# Patient Record
Sex: MALE | Race: WHITE | Employment: FULL TIME | URBAN - NONMETROPOLITAN AREA
[De-identification: names, ages, dates, MRNs, and addresses within clinical notes are randomized per-mention and may not be internally consistent; named-entity substitution may affect disease eponyms.]

---

## 2020-11-17 ENCOUNTER — OFFICE VISIT (OUTPATIENT)
Dept: ORTHOPEDIC SURGERY | Age: 60
End: 2020-11-17
Payer: COMMERCIAL

## 2020-11-17 VITALS — BODY MASS INDEX: 24.52 KG/M2 | WEIGHT: 185 LBS | HEIGHT: 73 IN

## 2020-11-17 PROCEDURE — 99203 OFFICE O/P NEW LOW 30 MIN: CPT | Performed by: ORTHOPAEDIC SURGERY

## 2020-11-17 RX ORDER — METHYLPREDNISOLONE 4 MG/1
TABLET ORAL
Qty: 1 KIT | Refills: 0 | Status: SHIPPED | OUTPATIENT
Start: 2020-11-17

## 2020-11-17 NOTE — PROGRESS NOTES
I am evaluating this patient as a consult at the request of No referring provider defined for this encounter. Chief Complaint:  New Patient (LEFT SHOULDER FELL ONE MONTH AGO)      History of Present Illness:  Jenise Horner is a  64 y.o. left hand dominant male here regarding left shoulder pain, patient was mowing his lawn 1 month ago when he fell on outstretched left arm, over the last month he has been using Tylenol, ibuprofen, ice and activity modification without improvement of symptoms. 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 Patient denies numbness and tingling down the arm, he currently has pain with lifting, pulling patients and overhead activity. Pain also affects his sleep at night. Pain Assessment:       Medical History:  No past medical history on file.   Past Surgical History:   Procedure Laterality Date    ANKLE SURGERY      KNEE ARTHROSCOPY Right     meniscus repair    KNEE SURGERY      LITHOTRIPSY      SINUS SURGERY       Social History     Socioeconomic History    Marital status:      Spouse name: Not on file    Number of children: Not on file    Years of education: Not on file    Highest education level: Not on file   Occupational History    Not on file   Social Needs    Financial resource strain: Not on file    Food insecurity     Worry: Not on file     Inability: Not on file    Transportation needs     Medical: Not on file     Non-medical: Not on file   Tobacco Use    Smoking status: Never Smoker   Substance and Sexual Activity    Alcohol use: No     Alcohol/week: 0.0 standard drinks    Drug use: Not on file    Sexual activity: Not on file   Lifestyle    Physical activity     Days per week: Not on file     Minutes per session: Not on file    Stress: Not on file   Relationships    Social connections     Talks on phone: Not on file     Gets together: Not on file     Attends Spiritism service: Not on file     Active member of club or organization: Not on file     Attends meetings of clubs or organizations: Not on file     Relationship status: Not on file    Intimate partner violence     Fear of current or ex partner: Not on file     Emotionally abused: Not on file     Physically abused: Not on file     Forced sexual activity: Not on file   Other Topics Concern    Not on file   Social History Narrative    Not on file     Allergies   Allergen Reactions    Penicillins     Tramadol Other (See Comments)     \"GETS ADDICTED TO IT\"       Review of Systems:  Constitutional: negative  Respiratory: negative  Cardiovascular: negative  Musculoskeletal:negative except for New Patient (LEFT SHOULDER [address])    Relevant review of systems reviewed and available in the patient's chart in media tab    Vital Signs:  Vitals:    11/17/20 1457   Weight: 185 lb (83.9 kg)   Height: 6' 0.99\" (1.854 m)         General Exam:   Constitutional: Patient is adequately groomed with no evidence of malnutrition  Vascular: Examination reveals no swelling or calf tenderness. Peripheral pulses are palpable and 2+. Neurological: The patient has good coordination. There is no weakness or sensory deficit. PHYSICAL EXAMINATION: Inspection of the left shoulder reveals warm, dry, intact skin. There is no adenopathy. The distal neurovascular exam is grossly intact. Range of motion is 160 degrees of active forward flexion with discomfort past 90 degrees, positive empty can test with 4- out of 5 strength and discomfort, 4+ out of 5 strength with infraspinatus testing and subscapularis testing. Minimal tenderness in the bicipital groove, no pain with speeds or Yergason's. Strength is graded 5/5 in all other muscle groups. Examination of the contralateral shoulder reveals no atrophy or deformity. The skin is warm and dry.  Range of motion is within normal limits. There is no focal tenderness with palpation. Provocative SLAP, biceps tension, apprehension AC joint or rotator cuff tests are negative. Strength is graded 5/5 in all muscle groups. The distal neurovascular exam is grossly intact. Cervical spine: The skin is warm and dry. There is no swelling, warmth, or erythema. Range of motion is within normal limits. There is no paraspinal or spinous process tenderness. Spurling's sign is negative and did not produce shoulder pain. The distal neurovascular exam is grossly intact. Additional Comments: Sensation is intact to light touch throughout the median, ulnar and radial nerve distribution. Able to wiggle fingers, give thumbs up, A-OK and cross index and middle fingers. X-RAYS:  Four views of the left shoulder are obtained and reviewed. There is no periosteal reaction, medullary lesions, or osteopenia. Glenohumeral space is well maintained, the acromioclavicular joint space shows moderate degenerative changes, acromiohumeral space is well-maintained. Type 2 acromion. The lung fields are clear. Assessment: Left shoulder pain concerning for rotator cuff tear    Impression:  Encounter Diagnosis   Name Primary?     Left shoulder pain, unspecified chronicity Yes       Office Procedures:  Orders Placed This Encounter   Procedures    XR SHOULDER LEFT (MIN 2 VIEWS)    MRI SHOULDER LEFT WO CONTRAST     Standing Status:   Future     Standing Expiration Date:   11/17/2021     Scheduling Instructions:      [facility] [address] [place], [facility] [address]      [phone]      FAX: [phone]            **PUSH IMAGES TO [facility] PACS**                  PENDING APPROVAL FROM [facility] DEPARTMENT             TIME AND DATE PATIENT AVAILABILITY:             Friday or Saturday gets home at 6:30AM schedule 7:30AM-8AM     Order Specific Question:   Reason for exam:     Answer:   R/O LEFT ROTATOR CUFF TEAR     No orders of the defined types were placed in this encounter. Treatment Plan: Based on patient's history and physical exam I'm concerned about rotator cuff tear therefore I would like to obtain an MRI to further evaluate. Once the MRI is obtained the patient will follow up with me for further evaluation and discussion. Patient agrees with this plan, all of their questions were answered best of our ability and to their satisfaction. Provided a Medrol Dosepak today for pain and inflammation.         [facility]

## 2020-11-18 ENCOUNTER — TELEPHONE (OUTPATIENT)
Dept: ORTHOPEDIC SURGERY | Age: 60
End: 2020-11-18

## 2020-11-18 NOTE — TELEPHONE ENCOUNTER
Called & talked to the patient's wife informing her that I am still waiting on our pre-cert department to approve the MRI, once it is approved I will call and schedule the appointment. Then I will call them and let them know it has been approved and scheduled.

## 2020-11-20 ENCOUNTER — TELEPHONE (OUTPATIENT)
Dept: ORTHOPEDIC SURGERY | Age: 60
End: 2020-11-20

## 2020-11-23 ENCOUNTER — TELEPHONE (OUTPATIENT)
Dept: ORTHOPEDIC SURGERY | Age: 60
End: 2020-11-23

## 2020-11-23 NOTE — TELEPHONE ENCOUNTER
Called & left a voicemail for the wife returning a call in regards to her 's MRI. She can call me at [phone] ext.  [phone]

## 2020-11-24 ENCOUNTER — OFFICE VISIT (OUTPATIENT)
Dept: ORTHOPEDIC SURGERY | Age: 60
End: 2020-11-24
Payer: COMMERCIAL

## 2020-11-24 ENCOUNTER — TELEPHONE (OUTPATIENT)
Dept: ORTHOPEDIC SURGERY | Age: 60
End: 2020-11-24

## 2020-11-24 VITALS — BODY MASS INDEX: 24.52 KG/M2 | WEIGHT: 185 LBS | HEIGHT: 73 IN

## 2020-11-24 PROCEDURE — 99214 OFFICE O/P EST MOD 30 MIN: CPT | Performed by: ORTHOPAEDIC SURGERY

## 2020-11-24 PROCEDURE — L3670 SO ACRO/CLAV CAN WEB PRE OTS: HCPCS | Performed by: ORTHOPAEDIC SURGERY

## 2020-11-24 NOTE — TELEPHONE ENCOUNTER
Called & talked with the patient informing him that since he got his MRI done, we could see him today at 11:10AM, Patient understood and will come at 11:10AM at [facility] to go over MRI results w/ Doctor Fred Salazar.

## 2020-11-24 NOTE — PROGRESS NOTES
Chief Complaint:  Follow-up (TR MRI LEFT SHOULDER)      SUBJECTIVE:  Mahesh Pearson is a 64 y.o. male who returns today to review MRI results of the left shoulder, continues to have anterior and lateral pain as well as weakness, states he had complete loss of strength this week while trying to lift a gallon of milk. Is here today with his wife. Injury occurred in October while mowing his lawn. Pain Assessment:         Medical History:  No past medical history on file.   Past Surgical History:   Procedure Laterality Date    ANKLE SURGERY      KNEE ARTHROSCOPY Right     meniscus repair    KNEE SURGERY      LITHOTRIPSY      SINUS SURGERY       Social History     Socioeconomic History    Marital status:      Spouse name: Not on file    Number of children: Not on file    Years of education: Not on file    Highest education level: Not on file   Occupational History    Not on file   Social Needs    Financial resource strain: Not on file    Food insecurity     Worry: Not on file     Inability: Not on file    Transportation needs     Medical: Not on file     Non-medical: Not on file   Tobacco Use    Smoking status: Never Smoker   Substance and Sexual Activity    Alcohol use: No     Alcohol/week: 0.0 standard drinks    Drug use: Not on file    Sexual activity: Not on file   Lifestyle    Physical activity     Days per week: Not on file     Minutes per session: Not on file    Stress: Not on file   Relationships    Social connections     Talks on phone: Not on file     Gets together: Not on file     Attends Mormon service: Not on file     Active member of club or organization: Not on file     Attends meetings of clubs or organizations: Not on file     Relationship status: Not on file    Intimate partner violence     Fear of current or ex partner: Not on file     Emotionally abused: Not on file     Physically abused: Not on file     Forced sexual activity: Not on file   Other Topics Concern    Not on file   Social History Narrative    Not on file     Current Outpatient Medications   Medication Sig Dispense Refill    methylPREDNISolone (MEDROL, SRAVANTHI,) 4 MG tablet Take by mouth. 1 kit 0    meloxicam (MOBIC) 15 MG tablet Take 1 tablet by mouth daily 30 tablet 3    acetaminophen-codeine (TYLENOL/CODEINE #3) 300-30 MG per tablet Take 1 tablet by mouth every 4 hours as needed for Pain 40 tablet 0    ibuprofen (ADVIL;MOTRIN) 200 MG tablet Take 200 mg by mouth every 6 hours as needed for Pain       No current facility-administered medications for this visit. Allergies   Allergen Reactions    Penicillins     Tramadol Other (See Comments)     \"GETS ADDICTED TO IT\"       Review of Systems:  Constitutional: negative  Respiratory: negative  Cardiovascular: negative  Musculoskeletal:negative except for Follow-up (TR MRI LEFT SHOULDER)    Relevant review of systems reviewed and available in the patient's chart in media tab    General Exam:   Constitutional: Patient is adequately groomed with no evidence of malnutrition  Mental Status: The patient is oriented to time, place and person. The patient's mood and affect are appropriate. Vascular: Examination reveals no swelling or calf tenderness. Peripheral pulses are palpable and 2+. OBJECTIVE:  Vital Signs:  Vitals:    11/24/20 1106   Weight: 185 lb (83.9 kg)   Height: 6' 0.99\" (1.854 m)       Appearance: alert, well appearing, and in no distress, oriented to person, place, and time and normal appearing weight. Physical exam:   180 degrees of active forward flexion today, tenderness to palpation in the bicipital groove and pain with speeds and Yergason's, discomfort with belly press and bearhug as well as 4 out of 5 strength. 4+ out of 5 strength and pain with empty can test.  Skin clean dry and intact. Distal neurovascular exam is intact      X-RAYS:  Four views of the left shoulder are reviewed.  There is no periosteal reaction, medullary lesions, or osteopenia. Glenohumeral space is well maintained, the acromioclavicular joint space shows moderate degenerative changes, acromiohumeral space is well-maintained. Type 2 acromion. The lung fields are clear. MRI images of the left shoulder were reviewed and show:  1. Biceps pulley mechanism injury, with partial-thickness delamination tear of the superior subscapularis tendon insertional fibers, injury of the coracohumeral and transverse ligaments, and mild tendinopathy and partial-thickness tear of the distal intra-articular peripheral transitional long head biceps tendon, which is medially perched at the margin of the bicipital groove. 2. Chronic, nondisplaced SLAP 2C type tear of the superior glenoid labrum. 3. Mild to moderate tendinopathy and peritendinitis of the supraspinatus and infraspinatus tendons, with small, subcentimeter interstitial delamination tears of the supraspinatus anterior footprint and infraspinatus posterolateral footprint. 4. Mild to moderate AC joint arthrosis, with mildly inflamed mesoacromion-type os acromiale. Assessment : Left shoulder incomplete subscapularis tear, incomplete supraspinatus tear, medial subluxation of biceps with tendinopathy    Impression:  Encounter Diagnoses   Name Primary?  Incomplete tear of left rotator cuff, unspecified whether traumatic Yes    Biceps muscle tear, left, initial encounter        Office Procedures:  Orders Placed This Encounter   Procedures    Breg Sling Shot 2 Shoulder Sling     Patient was prescribed a Breg Sling Shot II Shoulder Brace. The left shoulder will require stabilization / immobilization from this orthosis. The orthosis will assist in protecting the affected area, provide functional support and facilitate healing. The device was ordered and fit on 11/24/2020.     The patient was educated and fit by a healthcare professional with expert knowledge and specialization in brace application while under the direct minutes face to face with the patient including 13+ minutes discussing and answering questions regarding the risks, benefits, and complications of left shoulder video arthroscopy biceps tenodesis, subscapularis repair, evaluation of supraspinatus with possible repair, subacromial decompression   in detail. We talked about the risks of surgery which include but are not limited to infection, bleeding, nerve injury resulting in motor or sensory loss, DVT, RSD, persistent pain, loss of motion, functional instability, and need for further surgery. At no time were any guarantees implied or stated. The patient acknowledged these risks and electively reviewed and signed the consent form. Surgery will be scheduled for a mutually convenient time. Patient will obtain medical clearance from their PCP prior to surgery. Sling was provided today to wear postoperatively. Postoperative rehab protocols were discussed in detail, patient will remain in the sling 6 weeks post surgery and will attend physical therapy likely 3 months, as he is left-hand-dominant he may require time away from work up to 3 months before returning to heavy lifting. Patient and his wife agrees with this plan, all of their questions were answered best of our ability and to their satisfaction.       Myranda Larios

## 2020-11-27 ENCOUNTER — HOSPITAL ENCOUNTER (OUTPATIENT)
Age: 60
Discharge: HOME | End: 2020-11-27
Payer: COMMERCIAL

## 2020-11-27 PROCEDURE — U0002 COVID-19 LAB TEST NON-CDC: HCPCS

## 2020-11-27 PROCEDURE — U0003 INFECTIOUS AGENT DETECTION BY NUCLEIC ACID (DNA OR RNA); SEVERE ACUTE RESPIRATORY SYNDROME CORONAVIRUS 2 (SARS-COV-2) (CORONAVIRUS DISEASE [COVID-19]), AMPLIFIED PROBE TECHNIQUE, MAKING USE OF HIGH THROUGHPUT TECHNOLOGIES AS DESCRIBED BY CMS-2020-01-R: HCPCS

## 2020-11-28 LAB — SARS-COV-2, PCR: NOT DETECTED

## 2020-12-08 ENCOUNTER — TELEPHONE (OUTPATIENT)
Dept: ORTHOPEDIC SURGERY | Age: 60
End: 2020-12-08

## 2020-12-08 RX ORDER — OXYCODONE HYDROCHLORIDE AND ACETAMINOPHEN 5; 325 MG/1; MG/1
1 TABLET ORAL EVERY 6 HOURS PRN
Qty: 28 TABLET | Refills: 0 | Status: SHIPPED | OUTPATIENT
Start: 2020-12-08 | End: 2020-12-15

## 2020-12-08 NOTE — TELEPHONE ENCOUNTER
Patient wife called and left message stating that the patient was having to take his pain medication q4hr for discomfort.  Wife said they were told this was okay and would reach out for a refill when needed       Surgery date 12/4   LEFT SHOULDER

## 2020-12-14 ENCOUNTER — TELEPHONE (OUTPATIENT)
Dept: ORTHOPEDIC SURGERY | Age: 60
End: 2020-12-14

## 2020-12-14 RX ORDER — OXYCODONE HYDROCHLORIDE AND ACETAMINOPHEN 5; 325 MG/1; MG/1
1 TABLET ORAL EVERY 6 HOURS PRN
Qty: 28 TABLET | Refills: 0 | Status: SHIPPED | OUTPATIENT
Start: 2020-12-14 | End: 2020-12-21

## 2020-12-15 ENCOUNTER — OFFICE VISIT (OUTPATIENT)
Dept: ORTHOPEDIC SURGERY | Age: 60
End: 2020-12-15

## 2020-12-15 ENCOUNTER — HOSPITAL ENCOUNTER (OUTPATIENT)
Dept: PHYSICAL THERAPY | Age: 60
Setting detail: THERAPIES SERIES
Discharge: HOME | End: 2020-12-15
Payer: COMMERCIAL

## 2020-12-15 ENCOUNTER — TELEPHONE (OUTPATIENT)
Dept: ORTHOPEDIC SURGERY | Age: 60
End: 2020-12-15

## 2020-12-15 VITALS — WEIGHT: 185 LBS | HEIGHT: 73 IN | BODY MASS INDEX: 24.52 KG/M2

## 2020-12-15 PROBLEM — Z98.890 S/P ARTHROSCOPY OF LEFT SHOULDER: Status: ACTIVE | Noted: 2020-12-15

## 2020-12-15 PROCEDURE — 97140 MANUAL THERAPY 1/> REGIONS: CPT

## 2020-12-15 PROCEDURE — 97016 VASOPNEUMATIC DEVICE THERAPY: CPT

## 2020-12-15 PROCEDURE — 97161 PT EVAL LOW COMPLEX 20 MIN: CPT

## 2020-12-15 PROCEDURE — 99024 POSTOP FOLLOW-UP VISIT: CPT | Performed by: ORTHOPAEDIC SURGERY

## 2020-12-15 PROCEDURE — 97110 THERAPEUTIC EXERCISES: CPT

## 2020-12-15 NOTE — PLAN OF CARE
[facility] [address] [facility] [address] [facility] [place], [address] [facility] [address]  Phone: [phone], [phone]                                                    Physical Therapy Certification    Dear Referring Practitioner: Osiris Winston,    We had the pleasure of evaluating the following patient for physical therapy services at [address] [place] [address]. A summary of our findings can be found in the initial assessment below. This includes our plan of care. If you have any questions or concerns regarding these findings, please do not hesitate to contact me at the office phone number checked above. Thank you for the referral.       Physician Signature:_______________________________Date:__________________  By signing above (or electronic signature), therapists plan is approved by physician      Patient: Dorothy Gardner   : 1960   MRN: [id number]  Referring Physician: Referring Practitioner: Osiris Winston      Evaluation Date: 12/15/2020      Medical Diagnosis Information:  Diagnosis: s/p Left Shoulder RTC Debridement, Biceps Tenodesis, SAD 2020   Treatment Diagnosis: M75.112                                         Insurance information: PT Insurance Information: BCBS    Precautions/ Contra-indications/Relevant Medical History:     C-SSRS Triggered by Intake questionnaire (Past 2 wk assessment):   [x] No, Questionnaire did not trigger screening.   [] Yes, Patient intake triggered further evaluation      [] C-SSRS Screening completed  [] PCP notified via Plan of Care  [] Emergency services notified     Latex Allergy:  [x]NO      []YES     Preferred Language for Healthcare:   [x]English       []other:    SUBJECTIVE: Patient stated complaint: Patient is a 65 y/o male who presents s/p Left Shoulder Scope for debridement of RTC along with Bicep Tenodesis and SAD. Patient reports hurt in in October while mowing.     Functional Disability Index: Quick Dash/Modified Oswestry: 82% (Total Number Sum: 47/55)    Pain Scale: 0/10current; 5/10 with movement   Easing factors: ice  Provocative factors: movement, wrong position of sling     Type: []Constant   [x]Intermittent  []Radiating []Localized []other:     Numbness/Tingling: Patient     Functional Limitations/Impairments: [x]Lifting/reaching [x]Grooming [x]Carrying    [x]ADL's [x]Driving [x]Sports/Recreations   []Other:    Occupation/School: [employer]iation [employer]     Living Status/Prior Level of Function: Independent with ADLs and IADLs     OBJECTIVE:     CERV ROM     Cervical Flexion WNL    Cervical Extension \"    Cervical SB \"    Cervical rotation \"         ROM Left Right   Shoulder Flex 90 °  WNL   Shoulder Abd  \"   Shoulder ER 15 ° at 30 ° Abd \"   Shoulder IR  \"   Elbow Flex  \"   Elbow Ext Lacking about 15 °  \"   Wrist Flex     Wrist Ext     Strength  Left Right   Shoulder Flex N.T. 5/5   Shoulder Scap \" \"   Shoulder ER \" \"   Shoulder IR \" \"   Elbow Flex \" \"   Elbow Ext \" \"   Wrist Flex     Wrist Ext     Davie        Reflexes/Sensation (myotomes/dermatomes): dematomes ( WNL)   []Normal    []Abnormal:      Joint mobility:    []Normal    [x]Hypo   []Hyper    Palpation: noted tenderness throughout bicep muscle belly and throughout shoulder joint    Functional Mobility/Transfers: slowed and adapted     Posture: elevated shoulder and head tilted     Bandages/Dressings/Incisions: surgical incision     Gait (include devices/WB status): decreased arm swing with decreased    Orthopedic Special Tests: n/a post-op    [x] Patient history, allergies, meds reviewed. Medical chart reviewed. See intake form. Review Of Systems (ROS):  [x]Performed Review of systems (Integumentary, CardioPulmonary, Neurological) by intake and observation. Intake form has been scanned into medical record. Patient has been instructed to contact their primary care physician regarding ROS issues if not already being addressed at this time. functional questionnaire and intake)   []Reduced ability to tolerate prolonged functional positions   []Reduced ability or difficulty with changes of positions or transfers between positions   [x]Reduced ability to maintain good posture and demonstrate good body mechanics with sitting, bending, and lifting   [x] Reduced ability or tolerance with driving and/or computer work   [x]Reduced ability to sleep   [x]Reduced ability to perform lifting, reaching, carrying tasks   [x]Reduced ability to tolerate impact through UE   [x]Reduced ability to reach behind back   [x]Reduced ability to  or hold objects   [x]Reduced ability to throw or toss an object   []other:    Participation Restrictions   [x]Reduced participation in self care activities   [x]Reduced participation in home management activities   [x]Reduced participation in work activities   [x]Reduced participation in social activities. [x]Reduced participation in sport/recreation activities. Classification:   [x]Signs/symptoms consistent with post-surgical status including decreased ROM, strength and function. []Signs/symptoms consistent with joint sprain/strain   []Signs/symptoms consistent with shoulder impingement   []Signs/symptoms consistent with shoulder/elbow/wrist tendinopathy   []Signs/symptoms consistent with Rotator cuff tear   []Signs/symptoms consistent with labral tear   []Signs/symptoms consistent with postural dysfunction    []Signs/symptoms consistent with Glenohumeral IR Deficit - <45 degrees   []Signs/symptoms consistent with facet dysfunction of cervical/thoracic spine    []Signs/symptoms consistent with pathology which may benefit from Dry needling     []other:      Tolerance of evaluation/treatment:    []Excellent   [x]Good    []Fair   []Poor    Physical Therapy Evaluation Complexity Justification   [x] A history of present problem with:  [] no personal factors and/or comorbidities that impact the plan of care;  []1-2 personal factors and/or comorbidities that impact the plan of care  [x]3 personal factors and/or comorbidities that impact the plan of care  [x] An examination of body systems using standardized tests and measures addressing any of the following: body structures and functions (impairments), activity limitations, and/or participation restrictions;:  [] a total of 1-2 or more elements   [] a total of 3 or more elements   [x] a total of 4 or more elements   [x] A clinical presentation with:  [x] stable and/or uncomplicated characteristics   [] evolving clinical presentation with changing characteristics  [] unstable and unpredictable characteristics;   [x] Clinical decision making of [x] low, [] moderate, [] high complexity using standardized patient assessment instrument and/or measurable assessment of functional outcome. [x] EVAL (LOW) 81893 (typically 20 minutes face-to-face)  [] EVAL (MOD) 89719 (typically 30 minutes face-to-face)  [] EVAL (HIGH) 20342 (typically 45 minutes face-to-face)  [] RE-EVAL     PLAN:  Frequency/Duration:  1-2 days per week for 12 weeks:  INTERVENTIONS:  [x]  Therapeutic exercise including: strength training, ROM, for upper extremity and core   [x]  NMR activation and proprioception for UE and Core   [x]  Manual therapy as indicated for UE and spine to include: Dry Needling/IASTM, STM, PROM, Gr I-IV mobilizations, manipulation. [x] Modalities as needed that may include: thermal agents, E-stim, Biofeedback, US, iontophoresis as indicated  [x] Patient education on joint protection, postural re-education, activity modification, progression of HEP. HEP instruction: (see scanned forms)    GOALS:    Short Term Goals: To be achieved in: 2 weeks  1. Independent in HEP and progression per patient tolerance, in order to prevent re-injury. [] Progressing: [] Met: [] Not Met: [] Adjusted   2.  Patient will have a decrease in pain to facilitate improvement in movement, function, and ADLs as indicated by Functional Deficits. [] Progressing: [] Met: [] Not Met: [] Adjusted     Long Term Goals: To be achieved in: 12 weeks  1. Disability index score of 20% or less for the QuickDash/Oswestry to assist with reaching prior level of function. [] Progressing: [] Met: [] Not Met: [] Adjusted   2. Patient will demonstrate increased AROM to equal the opposite side bilaterally to allow for proper joint functioning as indicated by patients Functional Deficits. [] Progressing: [] Met: [] Not Met: [] Adjusted   3. Patient will demonstrate an increase in strength of B UE  to allow for proper functional mobility as indicated by patients Functional Deficits. [] Progressing: [] Met: [] Not Met: [] Adjusted   4. Patient will return to all transfers, work activities, and functional activities without increased symptoms or restriction. [] Progressing: [] Met: [] Not Met: [] Adjusted   5. Patient will have 0/10 pain with ADL's.  [] Progressing: [] Met: [] Not Met: [] Adjusted   6.  Patient stated goal: To have full motion and strength of L UE  [] Progressing: [] Met: [] Not Met: [] Adjusted      Electronically signed by:  Delbert Zavala, PT

## 2020-12-15 NOTE — PROGRESS NOTES
HISTORY OF PRESENT ILLNESS: The patient returns today for the first postoperative visit after left shoulder video arthroscopy rotator cuff debridement, biceps tenodesis, subacromial decompression. Pain control has been satisfactory with oral medications. There have been no fevers or chills. 5/10 pain    DOS: 12/4/20     ]          PHYSICAL EXAMINATION: Inspection reveals expected swelling. Portal sites are clean and dry. The skin is warm. Range of motion is limited by pain and swelling. Bulge is noted a the biceps with flexion. The distal neurovascular exam is grossly intact. ASSESSMENT/PLAN: Doing well after left shoulder video arthroscopy rotator cuff debridement, biceps tenodesis, subacromial decompression. The UltraSling will be worn at all times except for hygiene and the prescribed exercises. I reviewed and demonstrated the standard postoperative exercises and provided them with a prescription for physical therapy. I have recommended ice and judicious use of narcotics. Bulge of the biceps was discussed, this will be cosmetic but will not effect function or strength. The sling will be worn at all times except for hygiene and the prescribed exercises. They will follow up in approximately six weeks for repeat evaluation. Diagnosis Orders   1.  S/P arthroscopy of left shoulder         David Timur

## 2020-12-15 NOTE — FLOWSHEET NOTE
[facility], [address] [place] [address] [address]o [address], [address]    Physical Therapy Treatment Note/ Progress Report:     Date:  12/15/2020    Patient Name:  Alan Gloria    :  1960  MRN: [id number]  Restrictions/Precautions:    Medical/Treatment Diagnosis Information:  · Diagnosis: s/p Left Shoulder RTC Debridement, Biceps Tenodesis, SAD 2020  · Treatment Diagnosis: F29.614  Insurance/Certification information:  PT Insurance Information: [facility]  Physician Information:  Referring Practitioner: Veronica Miranda  Has the plan of care been signed (Y/N):        []  Yes  [x]  No     Date of Patient follow up with Physician:     Is this a Progress Report:     []  Yes  [x]  No      If Yes:  Date Range for reporting period:  Initial Eval: 12/15/2020  Beginnin/15/2020 --- Endin2021    Progress report will be due (10 Rx or 30 days whichever is less):      Recertification will be due (POC Duration  / 90 days whichever is less): 3/15/2021      Visit # Insurance Allowable Auth Required   In Person 1 30 visits []  Yes     []  No    Tele Health -  []  Yes     []  No    Total 1         Functional Scale: Quick Dash: 82% (Total Number Sum: 47/55)  Date assessed: 12/15/2020     Latex Allergy:  [x]NO      []YES  Preferred Language for Healthcare:   [x]English       []other:    Pain level:  0/10current; 5/10 with movement     SUBJECTIVE:  See eval    OBJECTIVE: See eval   Observation:    Test measurements:      RESTRICTIONS/PRECAUTIONS:  Bicep Tenodesis /RTC Debridement    Exercises/Interventions:   Therapeutic Ex (07114)  Therapeutic Activity (06001)  NMR re-education (17551) Sets/Reps Notes/CUES   Pulley          Upper Trap Stretch 3 x 30\"    Levator Scap  Stretch 3 x 30\"         AAROM Elbow Flexion 20 x          Wrist flex/ext; Rad/Uln; Pro/Sup 20 x          Self PROM Flexion 15 x     Self PROM ER 15 x                                                                      Manual Intervention ([id number]) Jt Mobs 5'    PROM 10'    STM  5'                                       Patient Education 10' Pt ed with HEP and progress        Therapeutic Exercise and NMR EXR  [x] (33255) Provided verbal/tactile cueing for activities related to strengthening, flexibility, endurance, ROM  for improvements in scapular, scapulothoracic and UE control with self care, reaching, carrying, lifting, house/yardwork, driving/computer work. [x] (31721) Provided verbal/tactile cueing for activities related to improving balance, coordination, kinesthetic sense, posture, motor skill, proprioception  to assist with  scapular, scapulothoracic and UE control with self care, reaching, carrying, lifting, house/yardwork, driving/computer work. Therapeutic Activities:    [x] (79357 or 77561) Provided verbal/tactile cueing for activities related to improving balance, coordination, kinesthetic sense, posture, motor skill, proprioception and motor activation to allow for proper function of scapular, scapulothoracic and UE control with self care, carrying, lifting, driving/computer work.      Home Exercise Program:    [x] (46553) Reviewed/Progressed HEP activities related to strengthening, flexibility, endurance, ROM of scapular, scapulothoracic and UE control with self care, reaching, carrying, lifting, house/yardwork, driving/computer work  [x] (86912) Reviewed/Progressed HEP activities related to improving balance, coordination, kinesthetic sense, posture, motor skill, proprioception of scapular, scapulothoracic and UE control with self care, reaching, carrying, lifting, house/yardwork, driving/computer work      Manual Treatments:  PROM / STM / Oscillations-Mobs:  G-I, II, III, IV (PA's, Inf., Post.)  [x] (78163) Provided manual therapy to mobilize soft tissue/joints of cervical/CT, scapular GHJ and UE for the purpose of modulating pain, promoting relaxation,  increasing ROM, reducing/eliminating soft tissue swelling/inflammation/restriction, improving soft tissue extensibility and allowing for proper ROM for normal function with self care, reaching, carrying, lifting, house/yardwork, driving/computer work    Modalities:  Vaso x 15' L2     Charges:  Timed Code Treatment Minutes: 30'   Total Treatment Minutes:  60'   BWC:  TE TIME:  NMR TIME:  MANUAL TIME:  UNTIMED MINUTES:  Medicare Total:   -  -  -  -  -      [x] EVAL (LOW) 79205 (typically 20 minutes face-to-face)  [] EVAL (MOD) 40248 (typically 30 minutes face-to-face)  [] EVAL (HIGH) 17250 (typically 45 minutes face-to-face)  [] RE-EVAL     [x] WF(99355) x  1   [] IONTO  [] NMR (05111) x     [x] VASO x 1   [x] Manual (58532) x 1    [] Other:  [] TA x      [] Mech Traction (33113)  [] ES(attended) (50493)     [] ES (un) (97598):    ASSESSMENT:  See eval    GOALS:   Short Term Goals: To be achieved in: 2 weeks  1. Independent in HEP and progression per patient tolerance, in order to prevent re-injury. [] Progressing: [] Met: [] Not Met: [] Adjusted   2. Patient will have a decrease in pain to facilitate improvement in movement, function, and ADLs as indicated by Functional Deficits. [] Progressing: [] Met: [] Not Met: [] Adjusted     Long Term Goals: To be achieved in: 12 weeks  1. Disability index score of 20% or less for the QuickDash/Oswestry to assist with reaching prior level of function. [] Progressing: [] Met: [] Not Met: [] Adjusted   2. Patient will demonstrate increased AROM to equal the opposite side bilaterally to allow for proper joint functioning as indicated by patients Functional Deficits. [] Progressing: [] Met: [] Not Met: [] Adjusted   3. Patient will demonstrate an increase in strength of B UE  to allow for proper functional mobility as indicated by patients Functional Deficits. [] Progressing: [] Met: [] Not Met: [] Adjusted   4. Patient will return to all transfers, work activities, and functional activities without increased symptoms or restriction.    [] Progressing: [] Met: [] Not Met: [] Adjusted   5. Patient will have 0/10 pain with ADL's.  [] Progressing: [] Met: [] Not Met: [] Adjusted   6. Patient stated goal: To have full motion and strength of L UE  [] Progressing: [] Met: [] Not Met: [] Adjusted     Overall Progression Towards Functional goals/ Treatment Progress Update:  [] Patient is progressing as expected towards functional goals listed. [] Progression is slowed due to complexities/Impairments listed. [] Progression has been slowed due to co-morbidities. [x] Plan just implemented, too soon to assess goals progression <30days   [] Goals require adjustment due to lack of progress  [] Patient is not progressing as expected and requires additional follow up with physician  [] Other    Prognosis for POC: [x] Good [] Fair  [] Poor    Patient requires continued skilled intervention: [x] Yes  [] No    Treatment/Activity Tolerance:  [x] Patient able to complete treatment  [] Patient limited by fatigue  [] Patient limited by pain    [] Patient limited by other medical complications  [] Other:     Return to Play: (if applicable)   []  Stage 1: Intro to Strength   []  Stage 2: Return to Run and Strength   []  Stage 3: Return to Jump and Strength   []  Stage 4: Dynamic Strength and Agility   []  Stage 5: Sport Specific Training     []  Ready to Return to Play, Meets All Above Stages   []  Not Ready for Return to Sports   Comments:                         PLAN: See eval  [] Continue per plan of care [] Alter current plan (see comments above)  [x] Plan of care initiated [] Hold pending MD visit [] Discharge    Electronically signed by:  Inna Looney PT    Note: If patient does not return for scheduled/ recommended follow up visits, this note will serve as a discharge from care along with most recent update on progress.

## 2020-12-17 ENCOUNTER — TELEPHONE (OUTPATIENT)
Dept: ORTHOPEDIC SURGERY | Age: 60
End: 2020-12-17

## 2020-12-18 ENCOUNTER — HOSPITAL ENCOUNTER (OUTPATIENT)
Dept: PHYSICAL THERAPY | Age: 60
Setting detail: THERAPIES SERIES
Discharge: HOME | End: 2020-12-18
Payer: COMMERCIAL

## 2020-12-18 PROCEDURE — 97016 VASOPNEUMATIC DEVICE THERAPY: CPT | Performed by: PHYSICAL THERAPY ASSISTANT

## 2020-12-18 PROCEDURE — 97110 THERAPEUTIC EXERCISES: CPT | Performed by: PHYSICAL THERAPY ASSISTANT

## 2020-12-18 PROCEDURE — 97140 MANUAL THERAPY 1/> REGIONS: CPT | Performed by: PHYSICAL THERAPY ASSISTANT

## 2020-12-18 NOTE — FLOWSHEET NOTE
[facility], [address] [place] [address], [facility]    Physical Therapy Treatment Note/ Progress Report:     Date:  2020    Patient Name:  Christie Pringle    :  1960  MRN: [id number]  Restrictions/Precautions:    Medical/Treatment Diagnosis Information:  · Diagnosis: s/p Left Shoulder RTC Debridement, Biceps Tenodesis, SAD 2020  · Treatment Diagnosis: M18.844  Insurance/Certification information:  PT Insurance Information: [facility]  Physician Information:  Referring Practitioner: Sanjuana Macedo  Has the plan of care been signed (Y/N):        []  Yes  [x]  No     Date of Patient follow up with Physician:     Is this a Progress Report:     []  Yes  [x]  No      If Yes:  Date Range for reporting period:  Initial Eval: 12/15/2020  Beginnin/15/2020 --- Endin2021    Progress report will be due (10 Rx or 30 days whichever is less): 2149     Recertification will be due (POC Duration  / 90 days whichever is less): 3/15/2021      Visit # Insurance Allowable Auth Required   In Person 2 30 visits []  Yes     []  No    Tele Health -  []  Yes     []  No    Total 2         Functional Scale: Quick Dash: 82% (Total Number Sum: 47/55)  Date assessed: 12/15/2020     Latex Allergy:  [x]NO      []YES  Preferred Language for Healthcare:   [x]English       []other:    Pain level:  0/10current; 5/10 with movement     SUBJECTIVE:  Was sore after last visit. Exercises are going well. Sling still feels too short. Sleeping in a recliner.       OBJECTIVE: See eval   Observation:    Test measurements:      RESTRICTIONS/PRECAUTIONS:  Bicep Tenodesis /RTC Debridement    Exercises/Interventions:   Therapeutic Ex (93423)  Therapeutic Activity (35925)  NMR re-education (17513) Sets/Reps Notes/CUES   Pulley          Upper Trap Stretch 3 x 30\"    Levator Scap  Stretch 3 x 30\"         AAROM Elbow Flexion 20 x          Wrist flex/ext; Rad/Uln; Pro/Sup 20 x          Self PROM Flexion 15 x     Self PROM ER with cane 15 x               Table Slides flexion, ER, scaption  10x10\"                                                      Manual Intervention (50273)     Jt Mobs 5'    PROM 10'    STM  5'                                       Patient Education 10' Pt ed with HEP and progress        Therapeutic Exercise and NMR EXR  [x] (86994) Provided verbal/tactile cueing for activities related to strengthening, flexibility, endurance, ROM  for improvements in scapular, scapulothoracic and UE control with self care, reaching, carrying, lifting, house/yardwork, driving/computer work. [x] (26540) Provided verbal/tactile cueing for activities related to improving balance, coordination, kinesthetic sense, posture, motor skill, proprioception  to assist with  scapular, scapulothoracic and UE control with self care, reaching, carrying, lifting, house/yardwork, driving/computer work. Therapeutic Activities:    [x] (89507 or 60884) Provided verbal/tactile cueing for activities related to improving balance, coordination, kinesthetic sense, posture, motor skill, proprioception and motor activation to allow for proper function of scapular, scapulothoracic and UE control with self care, carrying, lifting, driving/computer work.      Home Exercise Program:    [x] (95266) Reviewed/Progressed HEP activities related to strengthening, flexibility, endurance, ROM of scapular, scapulothoracic and UE control with self care, reaching, carrying, lifting, house/yardwork, driving/computer work  [x] (95066) Reviewed/Progressed HEP activities related to improving balance, coordination, kinesthetic sense, posture, motor skill, proprioception of scapular, scapulothoracic and UE control with self care, reaching, carrying, lifting, house/yardwork, driving/computer work      Manual Treatments:  PROM / STM / Oscillations-Mobs:  G-I, II, III, IV (PA's, Inf., Post.)  [x] (40065) Provided manual therapy to mobilize soft tissue/joints of cervical/CT, scapular GHJ and UE for the purpose of modulating pain, promoting relaxation,  increasing ROM, reducing/eliminating soft tissue swelling/inflammation/restriction, improving soft tissue extensibility and allowing for proper ROM for normal function with self care, reaching, carrying, lifting, house/yardwork, driving/computer work    Modalities:  Vaso x 15' L2     Charges:  Timed Code Treatment Minutes: 39'   Total Treatment Minutes:  55'   BWC:  TE TIME:  NMR TIME:  MANUAL TIME:  UNTIMED MINUTES:  Medicare Total:   -  -  -  -  -      [] EVAL (LOW) 36021 (typically 20 minutes face-to-face)  [] EVAL (MOD) 87780 (typically 30 minutes face-to-face)  [] EVAL (HIGH) 31037 (typically 45 minutes face-to-face)  [] RE-EVAL     [x] KP(83718) x  2   [] IONTO  [] NMR (67826) x     [x] VASO x 1   [x] Manual (01222) x 1    [] Other:  [] TA x      [] Mech Traction (27797)  [] ES(attended) (73578)     [] ES (un) (52520):    ASSESSMENT:  See eval    GOALS:   Short Term Goals: To be achieved in: 2 weeks  1. Independent in HEP and progression per patient tolerance, in order to prevent re-injury. [x] Progressing: [] Met: [] Not Met: [] Adjusted   2. Patient will have a decrease in pain to facilitate improvement in movement, function, and ADLs as indicated by Functional Deficits. [x] Progressing: [] Met: [] Not Met: [] Adjusted     Long Term Goals: To be achieved in: 12 weeks  1. Disability index score of 20% or less for the QuickDash/Oswestry to assist with reaching prior level of function. [x] Progressing: [] Met: [] Not Met: [] Adjusted   2. Patient will demonstrate increased AROM to equal the opposite side bilaterally to allow for proper joint functioning as indicated by patients Functional Deficits. [x] Progressing: [] Met: [] Not Met: [] Adjusted   3. Patient will demonstrate an increase in strength of B UE  to allow for proper functional mobility as indicated by patients Functional Deficits.    [x] Progressing: [] Met: [] Not Met: [] Adjusted   4. Patient will return to all transfers, work activities, and functional activities without increased symptoms or restriction. [x] Progressing: [] Met: [] Not Met: [] Adjusted   5. Patient will have 0/10 pain with ADL's. [x] Progressing: [] Met: [] Not Met: [] Adjusted   6. Patient stated goal: To have full motion and strength of L UE  [x] Progressing: [] Met: [] Not Met: [] Adjusted     Overall Progression Towards Functional goals/ Treatment Progress Update:  [x] Patient is progressing as expected towards functional goals listed. [] Progression is slowed due to complexities/Impairments listed. [] Progression has been slowed due to co-morbidities.   [] Plan just implemented, too soon to assess goals progression <30days   [] Goals require adjustment due to lack of progress  [] Patient is not progressing as expected and requires additional follow up with physician  [] Other    Prognosis for POC: [x] Good [] Fair  [] Poor    Patient requires continued skilled intervention: [x] Yes  [] No    Treatment/Activity Tolerance:  [x] Patient able to complete treatment  [] Patient limited by fatigue  [] Patient limited by pain    [] Patient limited by other medical complications  [] Other:     Return to Play: (if applicable)   []  Stage 1: Intro to Strength   []  Stage 2: Return to Run and Strength   []  Stage 3: Return to Jump and Strength   []  Stage 4: Dynamic Strength and Agility   []  Stage 5: Sport Specific Training     []  Ready to Return to Play, Meets All Above Stages   []  Not Ready for Return to Sports   Comments:                         PLAN: See eval  [x] Continue per plan of care [] Alter current plan (see comments above)  [] Plan of care initiated [] Hold pending MD visit [] Discharge    Electronically signed by:  Clover Zafar PTA    Note: If patient does not return for scheduled/ recommended follow up visits, this note will serve as a discharge from care along with most recent update on progress.

## 2020-12-21 RX ORDER — OXYCODONE HYDROCHLORIDE AND ACETAMINOPHEN 5; 325 MG/1; MG/1
1 TABLET ORAL EVERY 6 HOURS PRN
Qty: 28 TABLET | Refills: 0 | Status: SHIPPED | OUTPATIENT
Start: 2020-12-21 | End: 2020-12-28

## 2020-12-22 ENCOUNTER — HOSPITAL ENCOUNTER (OUTPATIENT)
Dept: PHYSICAL THERAPY | Age: 60
Setting detail: THERAPIES SERIES
Discharge: HOME | End: 2020-12-22
Payer: COMMERCIAL

## 2020-12-22 PROCEDURE — 97110 THERAPEUTIC EXERCISES: CPT | Performed by: PHYSICAL THERAPY ASSISTANT

## 2020-12-22 PROCEDURE — 97016 VASOPNEUMATIC DEVICE THERAPY: CPT | Performed by: PHYSICAL THERAPY ASSISTANT

## 2020-12-22 PROCEDURE — 97140 MANUAL THERAPY 1/> REGIONS: CPT | Performed by: PHYSICAL THERAPY ASSISTANT

## 2020-12-22 NOTE — FLOWSHEET NOTE
[facility], [address] [place] [address] [address], [address]    Physical Therapy Treatment Note/ Progress Report:     Date:  2020    Patient Name:  Jazzy North    :  1960  MRN: [id number]  Restrictions/Precautions:    Medical/Treatment Diagnosis Information:  · Diagnosis: s/p Left Shoulder RTC Debridement, Biceps Tenodesis, SAD 2020  · Treatment Diagnosis: C75.538  Insurance/Certification information:  PT Insurance Information: [facility]  Physician Information:  Referring Practitioner: Jovany Ortega  Has the plan of care been signed (Y/N):        []  Yes  [x]  No     Date of Patient follow up with Physician:     Is this a Progress Report:     []  Yes  [x]  No      If Yes:  Date Range for reporting period:  Initial Eval: 12/15/2020  Beginnin/15/2020 --- Endin2021    Progress report will be due (10 Rx or 30 days whichever is less):      Recertification will be due (POC Duration  / 90 days whichever is less): 3/15/2021      Visit # Insurance Allowable Auth Required   In Person 3 30 visits []  Yes     []  No    Tele Health -  []  Yes     []  No    Total 3         Functional Scale: Quick Dash: 82% (Total Number Sum: 47/55)  Date assessed: 12/15/2020     Latex Allergy:  [x]NO      []YES  Preferred Language for Healthcare:   [x]English       []other:    Pain level:  0/10current; 5/10 with movement     SUBJECTIVE:  Patient reports that his elbow is really stiff and the most painful portion of the whole ordeal at this time.     OBJECTIVE: See eval   Observation:    Test measurements:      RESTRICTIONS/PRECAUTIONS:  Bicep Tenodesis /RTC Debridement    Exercises/Interventions:   Therapeutic Ex (58059)  Therapeutic Activity (50225)  NMR re-education (16433) Sets/Reps Notes/CUES   Pulley               Shrugs x20    Squeezes x20         Upper Trap Stretch 3 x 30\"    Levator Scap  Stretch 3 x 30\"         AAROM Elbow Flexion 20 x          Wrist flex/ext; Rad/Uln; Pro/Sup 20 x Self PROM Flexion 15 x     Self PROM ER with cane 15 x               Table Slides flexion, ER, scaption  10x10\" ea         Sub max isometrics pain free 5\" x5 ea IR/ER        Bent over pendulumn hang 10x10\"                                  Manual Intervention (98077)     Jt Mobs 5'    PROM 10'    STM  5'                                       Patient Education 10' Pt ed with HEP and progress        Therapeutic Exercise and NMR EXR  [x] (74924) Provided verbal/tactile cueing for activities related to strengthening, flexibility, endurance, ROM  for improvements in scapular, scapulothoracic and UE control with self care, reaching, carrying, lifting, house/yardwork, driving/computer work. [x] (31282) Provided verbal/tactile cueing for activities related to improving balance, coordination, kinesthetic sense, posture, motor skill, proprioception  to assist with  scapular, scapulothoracic and UE control with self care, reaching, carrying, lifting, house/yardwork, driving/computer work. Therapeutic Activities:    [x] (08625 or 54568) Provided verbal/tactile cueing for activities related to improving balance, coordination, kinesthetic sense, posture, motor skill, proprioception and motor activation to allow for proper function of scapular, scapulothoracic and UE control with self care, carrying, lifting, driving/computer work.      Home Exercise Program:    [x] (30455) Reviewed/Progressed HEP activities related to strengthening, flexibility, endurance, ROM of scapular, scapulothoracic and UE control with self care, reaching, carrying, lifting, house/yardwork, driving/computer work  [x] (05503) Reviewed/Progressed HEP activities related to improving balance, coordination, kinesthetic sense, posture, motor skill, proprioception of scapular, scapulothoracic and UE control with self care, reaching, carrying, lifting, house/yardwork, driving/computer work      Manual Treatments:  PROM / STM / Oscillations-Mobs:  G-I, II, for proper functional mobility as indicated by patients Functional Deficits. [x] Progressing: [] Met: [] Not Met: [] Adjusted   4. Patient will return to all transfers, work activities, and functional activities without increased symptoms or restriction. [x] Progressing: [] Met: [] Not Met: [] Adjusted   5. Patient will have 0/10 pain with ADL's. [x] Progressing: [] Met: [] Not Met: [] Adjusted   6. Patient stated goal: To have full motion and strength of L UE  [x] Progressing: [] Met: [] Not Met: [] Adjusted     Overall Progression Towards Functional goals/ Treatment Progress Update:  [x] Patient is progressing as expected towards functional goals listed. [] Progression is slowed due to complexities/Impairments listed. [] Progression has been slowed due to co-morbidities.   [] Plan just implemented, too soon to assess goals progression <30days   [] Goals require adjustment due to lack of progress  [] Patient is not progressing as expected and requires additional follow up with physician  [] Other    Prognosis for POC: [x] Good [] Fair  [] Poor    Patient requires continued skilled intervention: [x] Yes  [] No    Treatment/Activity Tolerance:  [x] Patient able to complete treatment  [] Patient limited by fatigue  [] Patient limited by pain    [] Patient limited by other medical complications  [] Other:     Return to Play: (if applicable)   []  Stage 1: Intro to Strength   []  Stage 2: Return to Run and Strength   []  Stage 3: Return to Jump and Strength   []  Stage 4: Dynamic Strength and Agility   []  Stage 5: Sport Specific Training     []  Ready to Return to Play, Meets All Above Stages   []  Not Ready for Return to Sports   Comments:                         PLAN: See eval  [x] Continue per plan of care [] Alter current plan (see comments above)  [] Plan of care initiated [] Hold pending MD visit [] Discharge    Electronically signed by:  Soheila Chandra PTA    Note: If patient does not return for scheduled/ recommended follow up visits, this note will serve as a discharge from care along with most recent update on progress.

## 2020-12-29 ENCOUNTER — HOSPITAL ENCOUNTER (OUTPATIENT)
Dept: PHYSICAL THERAPY | Age: 60
Setting detail: THERAPIES SERIES
Discharge: HOME | End: 2020-12-29
Payer: COMMERCIAL

## 2020-12-29 PROCEDURE — 97140 MANUAL THERAPY 1/> REGIONS: CPT | Performed by: PHYSICAL THERAPY ASSISTANT

## 2020-12-29 PROCEDURE — 97110 THERAPEUTIC EXERCISES: CPT | Performed by: PHYSICAL THERAPY ASSISTANT

## 2020-12-29 PROCEDURE — 97112 NEUROMUSCULAR REEDUCATION: CPT | Performed by: PHYSICAL THERAPY ASSISTANT

## 2020-12-29 NOTE — FLOWSHEET NOTE
[facility], [address] [facility] [place], [address]    Physical Therapy Treatment Note/ Progress Report:     Date:  2020    Patient Name:  Victoria Gutiérrez    :  1960  MRN: [id number]  Restrictions/Precautions:    Medical/Treatment Diagnosis Information:  · Diagnosis: s/p Left Shoulder RTC Debridement, Biceps Tenodesis, SAD 2020  · Treatment Diagnosis: C93.391  Insurance/Certification information:  PT Insurance Information: BCBS  Physician Information:  Referring Practitioner: Chiquis Barker  Has the plan of care been signed (Y/N):        []  Yes  [x]  No     Date of Patient follow up with Physician:     Is this a Progress Report:     []  Yes  [x]  No      If Yes:  Date Range for reporting period:  Initial Eval: 12/15/2020  Beginnin/15/2020 --- Endin2021    Progress report will be due (10 Rx or 30 days whichever is less): 5282     Recertification will be due (POC Duration  / 90 days whichever is less): 3/15/2021      Visit # Insurance Allowable Auth Required   In Person 4 30 visits []  Yes     []  No    Tele Health -  []  Yes     []  No    Total 4         Functional Scale: Quick Dash: 82% (Total Number Sum: 47/55)  Date assessed: 12/15/2020     Latex Allergy:  [x]NO      []YES  Preferred Language for Healthcare:   [x]English       []other:    Pain level:  0/10current; 5/10 with movement     SUBJECTIVE: Patient reports that he feels pretty good, minimal pain in shoulder; however, has one spot that is extremely tight on the outside of his arm and it is driving him crazy. OBJECTIVE: See eval   Observation:    Test measurements:      RESTRICTIONS/PRECAUTIONS:  Bicep Tenodesis /RTC Debridement    Exercises/Interventions:   Therapeutic Ex (11649)  Therapeutic Activity (22878)  NMR re-education (36998) Sets/Reps Notes/CUES   Pulley x3 min         Wall slides NV?          Sub max pain free isometrics 5\" x10 ea    Tricep ext Red x20    AAROM bicep curl x10 ea 3 way Shrugs x20    TB Scap retxn x30 Green   TB ext x20 Red             Upper Trap Stretch 3 x 30\"    Levator Scap  Stretch 3 x 30\"              Wrist flex/ext; Rad/Uln; Pro/Sup 20 x               Table Slides flexion, ER, scaption  10x10\" ea              Prone scap retxn with ext x20 Heavy cue   Prone scap retxn with row x20 Heavy cue        [employer] x15    Cane flexion 5\" x10    Cane ER 5\" x10              SL ER x20 Cue for scap retxn/positioning                  Manual Intervention (63233)     Jt Mobs 5'    PROM 10'    STM  5'                                       Patient Education 10' Pt ed with HEP and progress        Therapeutic Exercise and NMR EXR  [x] (08714) Provided verbal/tactile cueing for activities related to strengthening, flexibility, endurance, ROM  for improvements in scapular, scapulothoracic and UE control with self care, reaching, carrying, lifting, house/yardwork, driving/computer work. [x] (41394) Provided verbal/tactile cueing for activities related to improving balance, coordination, kinesthetic sense, posture, motor skill, proprioception  to assist with  scapular, scapulothoracic and UE control with self care, reaching, carrying, lifting, house/yardwork, driving/computer work. Therapeutic Activities:    [x] (53991 or 92691) Provided verbal/tactile cueing for activities related to improving balance, coordination, kinesthetic sense, posture, motor skill, proprioception and motor activation to allow for proper function of scapular, scapulothoracic and UE control with self care, carrying, lifting, driving/computer work.      Home Exercise Program:    [x] (08812) Reviewed/Progressed HEP activities related to strengthening, flexibility, endurance, ROM of scapular, scapulothoracic and UE control with self care, reaching, carrying, lifting, house/yardwork, driving/computer work  [x] (17173) Reviewed/Progressed HEP activities related to improving balance, coordination, kinesthetic sense, posture, motor skill, proprioception of scapular, scapulothoracic and UE control with self care, reaching, carrying, lifting, house/yardwork, driving/computer work      Manual Treatments:  PROM / STM / Oscillations-Mobs:  G-I, II, III, IV (PA's, Inf., Post.)  [x] (92077) Provided manual therapy to mobilize soft tissue/joints of cervical/CT, scapular GHJ and UE for the purpose of modulating pain, promoting relaxation,  increasing ROM, reducing/eliminating soft tissue swelling/inflammation/restriction, improving soft tissue extensibility and allowing for proper ROM for normal function with self care, reaching, carrying, lifting, house/yardwork, driving/computer work    Modalities:       Charges:  Timed Code Treatment Minutes: 61'   Total Treatment Minutes:  60'   BWC:  TE TIME:  NMR TIME:  MANUAL TIME:  UNTIMED MINUTES:  Medicare Total:   -  -  -  -  -      [] EVAL (LOW) 22348 (typically 20 minutes face-to-face)  [] EVAL (MOD) 93556 (typically 30 minutes face-to-face)  [] EVAL (HIGH) 49487 (typically 45 minutes face-to-face)  [] RE-EVAL     [x] MT(26058) x  2   [] IONTO  [x] NMR (03625) x 1    [] VASO x   [x] Manual (82030) x 1    [] Other:  [] TA x      [] Mech Traction (61177)  [] ES(attended) (88165)     [] ES (un) (14514):    ASSESSMENT:  See eval    GOALS:   Short Term Goals: To be achieved in: 2 weeks  1. Independent in HEP and progression per patient tolerance, in order to prevent re-injury. [x] Progressing: [] Met: [] Not Met: [] Adjusted   2. Patient will have a decrease in pain to facilitate improvement in movement, function, and ADLs as indicated by Functional Deficits. [x] Progressing: [] Met: [] Not Met: [] Adjusted     Long Term Goals: To be achieved in: 12 weeks  1. Disability index score of 20% or less for the QuickDash/Oswestry to assist with reaching prior level of function. [x] Progressing: [] Met: [] Not Met: [] Adjusted   2.  Patient will demonstrate increased AROM to equal the opposite side bilaterally to allow for proper joint functioning as indicated by patients Functional Deficits. [x] Progressing: [] Met: [] Not Met: [] Adjusted   3. Patient will demonstrate an increase in strength of B UE  to allow for proper functional mobility as indicated by patients Functional Deficits. [x] Progressing: [] Met: [] Not Met: [] Adjusted   4. Patient will return to all transfers, work activities, and functional activities without increased symptoms or restriction. [x] Progressing: [] Met: [] Not Met: [] Adjusted   5. Patient will have 0/10 pain with ADL's. [x] Progressing: [] Met: [] Not Met: [] Adjusted   6. Patient stated goal: To have full motion and strength of L UE  [x] Progressing: [] Met: [] Not Met: [] Adjusted     Overall Progression Towards Functional goals/ Treatment Progress Update:  [x] Patient is progressing as expected towards functional goals listed. [] Progression is slowed due to complexities/Impairments listed. [] Progression has been slowed due to co-morbidities.   [] Plan just implemented, too soon to assess goals progression <30days   [] Goals require adjustment due to lack of progress  [] Patient is not progressing as expected and requires additional follow up with physician  [] Other    Prognosis for POC: [x] Good [] Fair  [] Poor    Patient requires continued skilled intervention: [x] Yes  [] No    Treatment/Activity Tolerance:  [x] Patient able to complete treatment  [] Patient limited by fatigue  [] Patient limited by pain    [] Patient limited by other medical complications  [] Other:     Return to Play: (if applicable)   []  Stage 1: Intro to Strength   []  Stage 2: Return to Run and Strength   []  Stage 3: Return to Jump and Strength   []  Stage 4: Dynamic Strength and Agility   []  Stage 5: Sport Specific Training     []  Ready to Return to Play, Meets All Above Stages   []  Not Ready for Return to Sports   Comments:                         PLAN: See eval  [x] Continue per plan of care [] Alter current plan (see comments above)  [] Plan of care initiated [] Hold pending MD visit [] Discharge    Electronically signed by:  Kassi Gardner PTA    Note: If patient does not return for scheduled/ recommended follow up visits, this note will serve as a discharge from care along with most recent update on progress.

## 2021-01-05 ENCOUNTER — HOSPITAL ENCOUNTER (OUTPATIENT)
Dept: PHYSICAL THERAPY | Age: 61
Setting detail: THERAPIES SERIES
Discharge: HOME | End: 2021-01-05
Payer: COMMERCIAL

## 2021-01-05 DIAGNOSIS — Z98.890 S/P ARTHROSCOPY OF LEFT SHOULDER: Primary | ICD-10-CM

## 2021-01-05 PROCEDURE — 97112 NEUROMUSCULAR REEDUCATION: CPT

## 2021-01-05 PROCEDURE — 97140 MANUAL THERAPY 1/> REGIONS: CPT

## 2021-01-05 PROCEDURE — 97110 THERAPEUTIC EXERCISES: CPT

## 2021-01-05 RX ORDER — HYDROCODONE BITARTRATE AND ACETAMINOPHEN 5; 325 MG/1; MG/1
1 TABLET ORAL EVERY 6 HOURS PRN
Qty: 28 TABLET | Refills: 0 | Status: SHIPPED | OUTPATIENT
Start: 2021-01-05 | End: 2021-01-12

## 2021-01-05 NOTE — FLOWSHEET NOTE
[facility], [address] [place] [address] [place], [address]    Physical Therapy Treatment Note/ Progress Report:     Date:  2021    Patient Name:  Sharita Germain    :  1960  MRN: [id number]  Restrictions/Precautions:    Medical/Treatment Diagnosis Information:  · Diagnosis: s/p Left Shoulder RTC Debridement, Biceps Tenodesis, SAD 2020  · Treatment Diagnosis: D62.755  Insurance/Certification information:  PT Insurance Information: [facility]  Physician Information:  Referring Practitioner: Lieutenant Adhikari  Has the plan of care been signed (Y/N):        []  Yes  [x]  No     Date of Patient follow up with Physician:     Is this a Progress Report:     []  Yes  [x]  No      If Yes:  Date Range for reporting period:  Initial Eval: 12/15/2020  Beginnin/15/2020 --- Endin2021    Progress report will be due (10 Rx or 30 days whichever is less): 9848     Recertification will be due (POC Duration  / 90 days whichever is less): 3/15/2021      Visit # Insurance Allowable Auth Required   In Person 1 ()  4 ()   30 visits () []  Yes     []  No    Tele Health -  []  Yes     []  No    Total 5 1 ()  4 ()        Functional Scale: Quick Dash: 82% (Total Number Sum: 47/55)  Date assessed: 12/15/2020     Latex Allergy:  [x]NO      []YES  Preferred Language for Healthcare:   [x]English       []other:    Pain level:  0/10current; 2-3/10 with movement    SUBJECTIVE: Patient reports feeling much better with regards to elbow pain he had been having   OBJECTIVE: See eval   Observation:    Test measurements:      RESTRICTIONS/PRECAUTIONS:  Bicep Tenodesis /RTC Debridement    Exercises/Interventions:   Therapeutic Ex (17722)  Therapeutic Activity (80738)  NMR re-education (44361) Sets/Reps Notes/CUES   Pulley 4 min         Wall slides 10 x 10\" Requires heavy cue for light stretch         Sub max pain free isometrics 10\" x10 ea    Tricep ext Red x20    AAROM bicep curl x10 ea 3 way        Shrugs 30 x     TB Scap retxn x30 Green   TB ext x20 Red             Upper Trap Stretch 3 x 30\"    Levator Scap  Stretch 3 x 30\"              Wrist flex/ext; Rad/Uln; Pro/Sup 20 x  HEP             Table Slides flexion, ER, scaption  10x10\" ea              Prone scap retxn with ext x10 Heavy cue   Prone scap retxn with row x15 Heavy cue        [employer]    Cane flexion 5\" x10    Cane ER 5\" x10              SL ER x20 Cue for scap retxn/positioning                  Manual Intervention (08407)     Jt Mobs 5'    PROM 10'    STM  5'                                       Patient Education 10' Pt ed with HEP and progress        Therapeutic Exercise and NMR EXR  [x] (59586) Provided verbal/tactile cueing for activities related to strengthening, flexibility, endurance, ROM  for improvements in scapular, scapulothoracic and UE control with self care, reaching, carrying, lifting, house/yardwork, driving/computer work. [x] (17047) Provided verbal/tactile cueing for activities related to improving balance, coordination, kinesthetic sense, posture, motor skill, proprioception  to assist with  scapular, scapulothoracic and UE control with self care, reaching, carrying, lifting, house/yardwork, driving/computer work. Therapeutic Activities:    [x] (75529 or 93240) Provided verbal/tactile cueing for activities related to improving balance, coordination, kinesthetic sense, posture, motor skill, proprioception and motor activation to allow for proper function of scapular, scapulothoracic and UE control with self care, carrying, lifting, driving/computer work.      Home Exercise Program:    [x] (26893) Reviewed/Progressed HEP activities related to strengthening, flexibility, endurance, ROM of scapular, scapulothoracic and UE control with self care, reaching, carrying, lifting, house/yardwork, driving/computer work  [x] (26728) Reviewed/Progressed HEP activities related to improving balance, coordination, kinesthetic sense, posture, motor skill, proprioception of scapular, scapulothoracic and UE control with self care, reaching, carrying, lifting, house/yardwork, driving/computer work      Manual Treatments:  PROM / STM / Oscillations-Mobs:  G-I, II, III, IV (Cora, Inf., Post.)  [x] (98213) Provided manual therapy to mobilize soft tissue/joints of cervical/CT, scapular GHJ and UE for the purpose of modulating pain, promoting relaxation,  increasing ROM, reducing/eliminating soft tissue swelling/inflammation/restriction, improving soft tissue extensibility and allowing for proper ROM for normal function with self care, reaching, carrying, lifting, house/yardwork, driving/computer work    Modalities:       Charges:  Timed Code Treatment Minutes: 61'   Total Treatment Minutes:  60'   BWC:  TE TIME:  NMR TIME:  MANUAL TIME:  UNTIMED MINUTES:  Medicare Total:   -  -  -  -  -      [] EVAL (LOW) 19120 (typically 20 minutes face-to-face)  [] EVAL (MOD) 14527 (typically 30 minutes face-to-face)  [] EVAL (HIGH) 90161 (typically 45 minutes face-to-face)  [] RE-EVAL     [x] ZD(87203) x  2   [] IONTO  [x] NMR (65964) x 1    [] VASO x   [x] Manual (27828) x 1    [] Other:  [] TA x      [] Mech Traction (90339)  [] ES(attended) (21222)     [] ES (un) (33257):    ASSESSMENT:  See eval    GOALS:   Short Term Goals: To be achieved in: 2 weeks  1. Independent in HEP and progression per patient tolerance, in order to prevent re-injury. [x] Progressing: [] Met: [] Not Met: [] Adjusted   2. Patient will have a decrease in pain to facilitate improvement in movement, function, and ADLs as indicated by Functional Deficits. [x] Progressing: [] Met: [] Not Met: [] Adjusted     Long Term Goals: To be achieved in: 12 weeks  1. Disability index score of 20% or less for the QuickDash/Oswestry to assist with reaching prior level of function. [x] Progressing: [] Met: [] Not Met: [] Adjusted   2.  Patient will demonstrate increased AROM to equal the opposite side bilaterally to allow for proper joint functioning as indicated by patients Functional Deficits. [x] Progressing: [] Met: [] Not Met: [] Adjusted   3. Patient will demonstrate an increase in strength of B UE  to allow for proper functional mobility as indicated by patients Functional Deficits. [x] Progressing: [] Met: [] Not Met: [] Adjusted   4. Patient will return to all transfers, work activities, and functional activities without increased symptoms or restriction. [x] Progressing: [] Met: [] Not Met: [] Adjusted   5. Patient will have 0/10 pain with ADL's. [x] Progressing: [] Met: [] Not Met: [] Adjusted   6. Patient stated goal: To have full motion and strength of L UE  [x] Progressing: [] Met: [] Not Met: [] Adjusted     Overall Progression Towards Functional goals/ Treatment Progress Update:  [x] Patient is progressing as expected towards functional goals listed. [] Progression is slowed due to complexities/Impairments listed. [] Progression has been slowed due to co-morbidities.   [] Plan just implemented, too soon to assess goals progression <30days   [] Goals require adjustment due to lack of progress  [] Patient is not progressing as expected and requires additional follow up with physician  [] Other    Prognosis for POC: [x] Good [] Fair  [] Poor    Patient requires continued skilled intervention: [x] Yes  [] No    Treatment/Activity Tolerance:  [x] Patient able to complete treatment  [] Patient limited by fatigue  [] Patient limited by pain    [] Patient limited by other medical complications  [] Other:     Return to Play: (if applicable)   []  Stage 1: Intro to Strength   []  Stage 2: Return to Run and Strength   []  Stage 3: Return to Jump and Strength   []  Stage 4: Dynamic Strength and Agility   []  Stage 5: Sport Specific Training     []  Ready to Return to Play, Meets All Above Stages   []  Not Ready for Return to Sports   Comments:                         PLAN: See eval  [x] Continue per plan of care [] Alter current plan (see comments above)  [] Plan of care initiated [] Hold pending MD visit [] Discharge    Electronically signed by:  Bonifacio Mauricio PT,     Note: If patient does not return for scheduled/ recommended follow up visits, this note will serve as a discharge from care along with most recent update on progress.

## 2021-01-07 ENCOUNTER — APPOINTMENT (OUTPATIENT)
Dept: PHYSICAL THERAPY | Age: 61
End: 2021-01-07
Payer: COMMERCIAL

## 2021-01-12 ENCOUNTER — OFFICE VISIT (OUTPATIENT)
Dept: ORTHOPEDIC SURGERY | Age: 61
End: 2021-01-12

## 2021-01-12 ENCOUNTER — APPOINTMENT (OUTPATIENT)
Dept: PHYSICAL THERAPY | Age: 61
End: 2021-01-12
Payer: COMMERCIAL

## 2021-01-12 VITALS — HEIGHT: 72 IN | WEIGHT: 185 LBS | BODY MASS INDEX: 25.06 KG/M2

## 2021-01-12 DIAGNOSIS — Z98.890 S/P ARTHROSCOPY OF LEFT SHOULDER: Primary | ICD-10-CM

## 2021-01-12 PROCEDURE — 99024 POSTOP FOLLOW-UP VISIT: CPT | Performed by: ORTHOPAEDIC SURGERY

## 2021-01-12 NOTE — LETTER
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 NO use of the left arm. If you have any questions or concerns, please don't hesitate to call.     Sincerely,        Carlota Zamudio, [address] [facility], DO

## 2021-01-12 NOTE — PROGRESS NOTES
HISTORY OF PRESENT ILLNESS: The patient returns today for 6 weeks after left shoulder video arthroscopy rotator cuff debridement, biceps tenodesis, subacromial decompression. He has 0 out of 10 pain today. Is progressing well with physical therapy. Would like to return to work with restrictions. He is here today with his wife. DOS: 12/4/20    Pain Assessment  Location of Pain: Shoulder  Location Modifiers: Left  Severity of Pain: 0  Limiting Behavior: Some  Relieving Factors: Rest, Ice  Result of Injury: No  Work-Related Injury: No  Are there other pain locations you wish to document?: No]          PHYSICAL EXAMINATION: Incisions are well-healed, he has appropriate contour of the biceps, there is some mild Abner deformity. He has 180 degrees of active forward flexion, functional internal rotation to L5.  4+ out of 5 strength with supraspinatus, infraspinatus and subscapularis testing the distal neurovascular exam is grossly intact. ASSESSMENT/PLAN: Doing well after left shoulder video arthroscopy rotator cuff debridement, biceps tenodesis, subacromial decompression. He can wean out of the sling, continue physical therapy mostly doing a home exercise program as the cost for formal therapy is outside their means. He will return to work no lifting pushing pulling more than 5 pounds with the left arm, door work only. They will follow up in approximately six weeks for repeat evaluation. Diagnosis Orders   1.  S/P arthroscopy of left shoulder         Oneda Most

## 2021-01-13 ENCOUNTER — TELEPHONE (OUTPATIENT)
Dept: ORTHOPEDIC SURGERY | Age: 61
End: 2021-01-13

## 2021-01-14 ENCOUNTER — APPOINTMENT (OUTPATIENT)
Dept: PHYSICAL THERAPY | Age: 61
End: 2021-01-14
Payer: COMMERCIAL

## 2021-01-19 ENCOUNTER — APPOINTMENT (OUTPATIENT)
Dept: PHYSICAL THERAPY | Age: 61
End: 2021-01-19
Payer: COMMERCIAL

## 2021-01-22 ENCOUNTER — APPOINTMENT (OUTPATIENT)
Dept: PHYSICAL THERAPY | Age: 61
End: 2021-01-22
Payer: COMMERCIAL

## 2021-01-26 ENCOUNTER — APPOINTMENT (OUTPATIENT)
Dept: PHYSICAL THERAPY | Age: 61
End: 2021-01-26
Payer: COMMERCIAL

## 2021-01-29 ENCOUNTER — APPOINTMENT (OUTPATIENT)
Dept: PHYSICAL THERAPY | Age: 61
End: 2021-01-29
Payer: COMMERCIAL